# Patient Record
Sex: FEMALE | Race: AMERICAN INDIAN OR ALASKA NATIVE
[De-identification: names, ages, dates, MRNs, and addresses within clinical notes are randomized per-mention and may not be internally consistent; named-entity substitution may affect disease eponyms.]

---

## 2022-04-10 ENCOUNTER — HOSPITAL ENCOUNTER (EMERGENCY)
Dept: HOSPITAL 5 - ED | Age: 32
Discharge: HOME | End: 2022-04-10
Payer: MEDICAID

## 2022-04-10 VITALS — DIASTOLIC BLOOD PRESSURE: 92 MMHG | SYSTOLIC BLOOD PRESSURE: 144 MMHG

## 2022-04-10 DIAGNOSIS — Z76.0: ICD-10-CM

## 2022-04-10 DIAGNOSIS — I10: Primary | ICD-10-CM

## 2022-04-10 PROCEDURE — 99282 EMERGENCY DEPT VISIT SF MDM: CPT

## 2022-04-10 NOTE — EMERGENCY DEPARTMENT REPORT
ED General Adult HPI





- General


Chief complaint: High BP


Stated complaint: HIGH BLOOD PRESSURE


Time Seen by Provider: 04/10/22 16:08


Source: patient


Mode of arrival: Ambulatory


Limitations: No Limitations





- History of Present Illness


Initial comments: 





31-year-old female presents to the ER today with complaints of elevated blood 

pressure.  Patient reports that she has been out of her blood pressure 

medication for the past 3 days.  She states that she takes labetalol 300 mg 3 

times a day and nifedipine 90 mg daily.  She states that her blood pressures 

have been running in the 150s over 100s.  She states to keep it down she has 

been taking some of her mom's blood pressure medication.  She states that she 

recently moved from [place], and currently does not have a primary care doctor.

 She does have insurance to repeat state, they did give her referral but she 

found that it was a pediatrician.  Patient reports headache and some mild pain 

in her right forearm which started after she was mopping the floor yesterday but

no additional symptoms at this time.


MD Complaint: Out of BP medication/Elevated BP 


-: days(s) (3)





- Related Data


                                  Previous Rx's











 Medication  Instructions  Recorded  Last Taken  Type


 


Labetalol HCl [Labetalol 300mg TAB] 300 mg PO TID #90 tab 04/10/22 Unknown Rx


 


NIFEdipine [Nifedipine ER] 90 mg PO DAILY #30 tab 04/10/22 Unknown Rx











                                    Allergies











Allergy/AdvReac Type Severity Reaction Status Date / Time


 


No Known Allergies Allergy   Unverified 04/10/22 15:51














ED Review of Systems


ROS: 


Stated complaint: HIGH BLOOD PRESSURE


Other details as noted in HPI





Comment: All other systems reviewed and negative


Constitutional: denies: chills, fever


Eyes: denies: eye pain, eye discharge, vision change


ENT: denies: ear pain, throat pain, dental pain, hearing loss, epistaxis, 

congestion


Respiratory: denies: cough, shortness of breath, SOB with exertion, stridor, 

wheezing


Cardiovascular: denies: chest pain, palpitations, dyspnea on exertion, edema, 

syncope, paroxysmal nocturnal dyspnea


Endocrine: no symptoms reported


Gastrointestinal: denies: abdominal pain, nausea, vomiting, diarrhea, 

constipation, hematemesis, melena, hematochezia


Genitourinary: denies: urgency, dysuria, frequency, hematuria, discharge, 

abnormal menses, dyspareunia


Musculoskeletal: myalgia


Skin: denies: rash, lesions


Neurological: denies: headache, weakness, numbness, paresthesias, confusion, 

abnormal gait, vertigo


Psychiatric: denies: anxiety, depression, auditory hallucinations, visual 

hallucinations, homicidal thoughts, suicidal thoughts


Hematological/Lymphatic: denies: easy bleeding, easy bruising, swollen glands





ED Past Medical Hx





- Medications


Home Medications: 


                                Home Medications











 Medication  Instructions  Recorded  Confirmed  Last Taken  Type


 


Labetalol HCl [Labetalol 300mg TAB] 300 mg PO TID #90 tab 04/10/22  Unknown Rx


 


NIFEdipine [Nifedipine ER] 90 mg PO DAILY #30 tab 04/10/22  Unknown Rx














ED Physical Exam





- General


Limitations: No Limitations


General appearance: alert, in no apparent distress





- Head


Head exam: Present: atraumatic, normocephalic, normal inspection





- Eye


Eye exam: Present: normal appearance, PERRL, EOMI


Pupils: Present: normal accommodation





- ENT


ENT exam: Present: normal exam, mucous membranes moist





- Neck


Neck exam: Present: normal inspection, full ROM.  Absent: meningismus





- Respiratory


Respiratory exam: Present: normal lung sounds bilaterally, wheezes.  Absent: 

respiratory distress, rales, rhonchi, stridor





- Cardiovascular


Cardiovascular Exam: Present: regular rate, normal rhythm, normal heart sounds





- Expanded Upper Extremity Exam


  ** Right


Forearm Wrist exam: Present: normal inspection, full ROM, tenderness (Mild soft 

tissue muscle tenderness noted to the forearm.).  Absent: swelling, abrasion, 

laceration, ecchymosis, deformity, crepidus, dislocation, erythema, tenderness 

over anatomical snuff box


Neuro motor exam: Present: wrist extension intact, thumb opposition intact, thum

b IP flexion intact, thumb adduction intact, fingers 2-5 abduction intact


Neurosensory exam: Present: radial nerve intact, ulnar nerve intact, median 

nerve intact


Vascular: Present: normal capillary refill, radial pulse (Normal).  Absent: 

vascular compromise





- Back Exam


Back exam: Present: full ROM





- Neurological Exam


Neurological exam: Present: alert, oriented X3, CN II-XII intact, normal gait.  

Absent: motor sensory deficit





- Psychiatric


Psychiatric exam: Present: normal affect, normal mood





- Skin


Skin exam: Present: intact





ED Course


                                   Vital Signs











  04/10/22





  15:51


 


Temperature 98.6 F


 


Pulse Rate 88


 


Respiratory 18





Rate 


 


Blood Pressure 144/92





[Right] 


 


O2 Sat by Pulse 98





Oximetry 














ED Medical Decision Making





- Medical Decision Making


1634:


31-year-old female presents to the ER today with complaints of elevated blood 

pressure.  Patient reports that she has been out of her blood pressure 

medication for the past 3 days.  She states that she takes labetalol 300 mg 3 

times a day and nifedipine 90 mg daily.  She states that her blood pressures 

have been running in the 150s over 100s.  She states to keep it down she has 

been taking some of her mom's blood pressure medication.  She states that she 

recently moved from [place], and currently does not have a primary care doctor.

  She does have insurance to repeat state, they did give her referral but she 

found that it was a pediatrician.  Patient reports headache and some mild pain 

in her right forearm which started after she was mopping the floor yesterday but

 no additional symptoms at this time.





Patient is well-appearing, nontoxic and not in any significant distress.  She is

 awake alert oriented x3 with a GCS of 15.  She has no focal neurological 

deficits on exam.  No meningeal signs.  Her gait is normal.  Chest is clear to 

auscultation abdomen soft and nontender.  Patient blood pressure only mildly 

elevated here in the ER, remainder vitals are stable.  At this time there is no 

indication for emergent lab testing or imaging.  Patient will be given a refill 

on her medications and recommend that she follow-up with primary care doctor 

listed on her discharge instructions or she can follow-up with someone at [facility] 

since her OB/GYN is at [facility] as well.  Patient expressed understanding for 

instructions and agree with plan.  Patient stable at time of discharge.


Critical care attestation.: 


If time is entered above; I have spent that time in minutes in the direct care 

of this critically ill patient, excluding procedure time.








ED Disposition


Clinical Impression: 


 Medication refill, Hypertension





Disposition: 01 HOME / SELF CARE / HOMELESS


Is pt being admited?: No


Does the pt Need Aspirin: No


Condition: Stable


Instructions:  Medicine Refill at the Emergency Department, Managing Your 

Hypertension, Hypertension, Adult, Hypertension (ED)


Additional Instructions: 


I recommend that you start taking the labetalol and nifedipine as prescribed for

 your blood pressure.  I recommend that you follow-up with primary care doctor. 

 You can call the number on your [employer] insurance and they can direct you to 

a primary care doctor or you can follow-up with a primary care doctor at [facility] 

or your OB/GYN is.  Return to the ER if anything changes or worsens in any way.


Prescriptions: 


Labetalol HCl [Labetalol 300mg TAB] 300 mg PO TID #90 tab


NIFEdipine [Nifedipine ER] 90 mg PO DAILY #30 tab


Referrals: 


HEATHER HOLLEY MD [Staff Physician] - 3-5 Days


Time of Disposition: 16:11

## 2022-05-27 ENCOUNTER — HOSPITAL ENCOUNTER (EMERGENCY)
Dept: HOSPITAL 5 - ED | Age: 32
LOS: 1 days | Discharge: LEFT BEFORE DISCHARGE | End: 2022-05-28
Payer: MEDICAID

## 2022-05-27 VITALS — DIASTOLIC BLOOD PRESSURE: 102 MMHG | SYSTOLIC BLOOD PRESSURE: 163 MMHG

## 2022-05-27 DIAGNOSIS — Z53.21: ICD-10-CM
